# Patient Record
Sex: FEMALE | Race: OTHER | URBAN - METROPOLITAN AREA
[De-identification: names, ages, dates, MRNs, and addresses within clinical notes are randomized per-mention and may not be internally consistent; named-entity substitution may affect disease eponyms.]

---
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
## 2023-01-30 ENCOUNTER — OFFICE VISIT (OUTPATIENT)
Dept: FAMILY MEDICINE CLINIC | Age: 23
End: 2023-01-30

## 2023-01-30 VITALS
OXYGEN SATURATION: 99 % | DIASTOLIC BLOOD PRESSURE: 68 MMHG | TEMPERATURE: 98.4 F | HEART RATE: 58 BPM | HEIGHT: 66 IN | SYSTOLIC BLOOD PRESSURE: 121 MMHG | WEIGHT: 217 LBS | BODY MASS INDEX: 34.87 KG/M2

## 2023-01-30 DIAGNOSIS — Z00.00 WELLNESS EXAMINATION: Primary | ICD-10-CM

## 2023-01-30 PROCEDURE — 99203 OFFICE O/P NEW LOW 30 MIN: CPT | Performed by: FAMILY MEDICINE

## 2023-01-30 NOTE — PROGRESS NOTES
Carlota Siddiqi seen at d/c, full name and  verified. After Visit Summary was mailed  and reviewed along with discharge instructions and when it is recommended to come back via phone . Advised patient that follow up is pending results, patient verbalized understanding. Will HERSON Chin    Patient left office prior to being discharged in office. Patient was called and given all instructions via phone. Informed that influenza vaccine can be given at her next appointment, patient verbalized understanding.   Will HERSON Chin

## 2023-01-30 NOTE — PROGRESS NOTES
Felipe Casiano is a 23 y.o. female   Chief Complaint   Patient presents with   [facility] Establish Care     Patient requesting check up    Immunization/Injection     FLu vaccine         ASSESSMENT AND PLAN:    1. Wellness examination  Normal vital signs and physical exam.  Labs today. Follow up pending results. - CBC WITH AUTOMATED DIFF; Future  - HEMOGLOBIN A1C WITH EAG; Future  - LIPID PANEL; Future  - METABOLIC PANEL, COMPREHENSIVE; Future  - TSH 3RD GENERATION; Future    SUBJECTIVE:    HPI:  Dona Sallisaw. Julius Cordero is a 23 y.o. female who presents for a checkup. She notes that she has gained weight over the past few months and her family has always advised her to check for diabetes because her Grandmother had it. She notes that certain foods cause disgust - like hamburgers. She has been thirsty and craving ice in particular. She has been urinating with mildly increased frequency. Sometimes her hands tingle. PMH: None  PSH: None  MEDS: None  ALL: NKDA  SH: Rare cigarette smoking (socially). Social alcohol and marijuana use. FH: GM - DM2    LMP 10 Dec.  Irregular -- she has a subdermal contraceptive implant (5 yr)  G0. Review of Systems   Constitutional:  Negative for fever and malaise/fatigue. Eyes:  Negative for blurred vision. Respiratory:  Negative for cough and shortness of breath. Cardiovascular:  Negative for chest pain, palpitations and leg swelling. Gastrointestinal:  Negative for abdominal pain, constipation, diarrhea, nausea and vomiting. Genitourinary:  Positive for frequency. Neurological:  Positive for tingling. Negative for dizziness and headaches. Endo/Heme/Allergies:  Positive for polydipsia.        /68 (BP 1 Location: Left upper arm, BP Patient Position: Sitting)   Pulse (!) 58   Temp 98.4 °F (36.9 °C) (Temporal)   Ht 5' 6.14\" (1.68 m)   Wt 217 lb (98.4 kg)   LMP 12/10/2022   SpO2 99%   BMI 34.87 kg/m²     Physical Exam  Constitutional: General: She is not in acute distress. Appearance: Normal appearance. HENT:      Head: Normocephalic and atraumatic. Mouth/Throat:      Mouth: Mucous membranes are moist.      Pharynx: Oropharynx is clear. No oropharyngeal exudate or posterior oropharyngeal erythema. Eyes:      Extraocular Movements: Extraocular movements intact. Conjunctiva/sclera: Conjunctivae normal.      Pupils: Pupils are equal, round, and reactive to light. Cardiovascular:      Rate and Rhythm: Normal rate and regular rhythm. Pulses: Normal pulses. Heart sounds: Normal heart sounds. Pulmonary:      Effort: Pulmonary effort is normal.      Breath sounds: Normal breath sounds. Abdominal:      General: Bowel sounds are normal. There is no distension. Palpations: Abdomen is soft. Tenderness: There is no abdominal tenderness. Musculoskeletal:         General: Normal range of motion. Cervical back: No tenderness. Right lower leg: No edema. Left lower leg: No edema. Lymphadenopathy:      Cervical: No cervical adenopathy. Neurological:      Mental Status: She is alert and oriented to person, place, and time.

## 2025-03-06 ENCOUNTER — TELEPHONE (OUTPATIENT)
Age: 25
End: 2025-03-06

## 2025-03-06 NOTE — TELEPHONE ENCOUNTER
Right sided chest pain close to the breast not constant comes and goes. A heaviness. When I touch it feels a little swollen and painful. The pain came in the back on the right side in the back this week too. Pain in the right arm. 3/10. The pain is like a heaviness. Dull intermittent. The pain started 3 weeks ago. The pt denied any sob, any fever or chills nausea or vomiting. Denied that she was breast feeding, the pt was advised to go to the ED if she was having a life threatening emergency. The pt's last office visit was 01/30/2023. The last progress note that day did not stated for the pt to follow up with an appt. I gave the pt all of the same day appt protocol and informed her of the [employer] website she see the CAV complete same day appt. March Calendar. Donna Ji RN

## 2025-03-06 NOTE — TELEPHONE ENCOUNTER
Tc to the pt responding to an [employer] message she had sent to Dr Mistry. She had stated she wanted to schedule an appt with Dr Mistry to discuss chest pain she had been having for a few weeks. I called the pt 2x. No answer. I left a message for her to call me back. Donna iJ RN

## 2025-03-20 ENCOUNTER — OFFICE VISIT (OUTPATIENT)
Age: 25
End: 2025-03-20

## 2025-03-20 VITALS — SYSTOLIC BLOOD PRESSURE: 132 MMHG | DIASTOLIC BLOOD PRESSURE: 79 MMHG | TEMPERATURE: 98 F

## 2025-03-20 DIAGNOSIS — R07.89 CHEST WALL PAIN: Primary | ICD-10-CM

## 2025-03-20 NOTE — PATIENT INSTRUCTIONS
Recommend Motrin and/or Tylenol.  May try ice or heat therapy.  Keep follow up with GYN tomorrow.

## 2025-03-20 NOTE — PROGRESS NOTES
Patient Name: Alejandra Sofia Henriquez Reyes   YOB: 2000   Patient Status: New patient,   Chief Complaint: Other (Chest wall pain on right side, right pectoral muscle pain, pain with touch, NKI, no cough or chest congestion)      ____________________________________________________________________________________________    External Records Reviewed: None    Limitation to History: None    Outside Historian: None    SUBJECTIVE/OBJECTIVE:  Alejandra Sofia Henriquez Reyes is a 24 y.o. female presents with complaint of right upper chest tenderness / pain.  Symptoms began 4 week(s) ago reportedly after getting angry and having a strong emotion and show no change since onset.  The patient denies fever, erythema or warmth, shortness of breath, or palpitations.  Patient reports taking nothing for symptoms. She reports that she has an appointment with GYN tomorrow for breast examination.  No other acute symptoms reported at this time.          PAST MEDICAL HISTORY:   Medical: Pt  has no past medical history on file.  Surgical: Pt  has no past surgical history on file.  Family: Pt family history is not on file.  Social: Pt   Social History     Socioeconomic History    Marital status: Single     Spouse name: Not on file    Number of children: Not on file    Years of education: Not on file    Highest education level: Not on file   Occupational History    Not on file   Tobacco Use    Smoking status: Some Days    Smokeless tobacco: Never   Substance and Sexual Activity    Alcohol use: Yes    Drug use: Yes     Types: Marijuana (Weed)    Sexual activity: Not on file   Other Topics Concern    Not on file   Social History Narrative    Not on file     Social Drivers of Health     Financial Resource Strain: Not on file   Food Insecurity: Not on file (1/30/2023)   Transportation Needs: Not on file   Physical Activity: Not on file   Stress: Not on file   Social Connections: Not on file   Intimate Partner Violence: Not

## 2025-06-03 ENCOUNTER — OFFICE VISIT (OUTPATIENT)
Age: 25
End: 2025-06-03
Payer: COMMERCIAL

## 2025-06-03 VITALS
RESPIRATION RATE: 18 BRPM | DIASTOLIC BLOOD PRESSURE: 70 MMHG | SYSTOLIC BLOOD PRESSURE: 120 MMHG | BODY MASS INDEX: 39.21 KG/M2 | TEMPERATURE: 97 F | HEART RATE: 92 BPM | OXYGEN SATURATION: 99 % | WEIGHT: 244 LBS | HEIGHT: 66 IN

## 2025-06-03 DIAGNOSIS — H92.02 LEFT EAR PAIN: ICD-10-CM

## 2025-06-03 DIAGNOSIS — J01.90 ACUTE BACTERIAL SINUSITIS: Primary | ICD-10-CM

## 2025-06-03 DIAGNOSIS — B96.89 ACUTE BACTERIAL SINUSITIS: Primary | ICD-10-CM

## 2025-06-03 PROCEDURE — 99213 OFFICE O/P EST LOW 20 MIN: CPT

## 2025-06-03 SDOH — ECONOMIC STABILITY: FOOD INSECURITY: WITHIN THE PAST 12 MONTHS, THE FOOD YOU BOUGHT JUST DIDN'T LAST AND YOU DIDN'T HAVE MONEY TO GET MORE.: NEVER TRUE

## 2025-06-03 SDOH — ECONOMIC STABILITY: FOOD INSECURITY: WITHIN THE PAST 12 MONTHS, YOU WORRIED THAT YOUR FOOD WOULD RUN OUT BEFORE YOU GOT MONEY TO BUY MORE.: NEVER TRUE

## 2025-06-03 ASSESSMENT — PATIENT HEALTH QUESTIONNAIRE - PHQ9
SUM OF ALL RESPONSES TO PHQ QUESTIONS 1-9: 0
SUM OF ALL RESPONSES TO PHQ QUESTIONS 1-9: 0
2. FEELING DOWN, DEPRESSED OR HOPELESS: NOT AT ALL
1. LITTLE INTEREST OR PLEASURE IN DOING THINGS: NOT AT ALL
SUM OF ALL RESPONSES TO PHQ QUESTIONS 1-9: 0
SUM OF ALL RESPONSES TO PHQ QUESTIONS 1-9: 0

## 2025-06-03 NOTE — PROGRESS NOTES
6/3/2025    Alejandra Sofia Henriquez Reyes (:  2000) is a 24 y.o. female, here for a preventive medicine evaluation.    Subjective   There is no problem list on file for this patient.      Review of Systems    Prior to Visit Medications    Medication Sig Taking? Authorizing Provider   [id number] PO Take by mouth Yes Provider, MD Pattie        No Known Allergies    History reviewed. No pertinent past medical history.    History reviewed. No pertinent surgical history.    Social History     Socioeconomic History    Marital status: Single     Spouse name: Not on file    Number of children: Not on file    Years of education: Not on file    Highest education level: Not on file   Occupational History    Not on file   Tobacco Use    Smoking status: Former     Types: Cigarettes     Passive exposure: Past    Smokeless tobacco: Never   Vaping Use    Vaping status: Never Used   Substance and Sexual Activity    Alcohol use: Not Currently    Drug use: Not Currently     Types: Marijuana (Weed)    Sexual activity: Yes     Partners: Male   Other Topics Concern    Not on file   Social History Narrative    Not on file     Social Drivers of Health     Financial Resource Strain: Not on file   Food Insecurity: No Food Insecurity (6/3/2025)    Hunger Vital Sign     Worried About Running Out of Food in the Last Year: Never true     Ran Out of Food in the Last Year: Never true   Transportation Needs: No Transportation Needs (6/3/2025)    PRAPARE - Transportation     Lack of Transportation (Medical): No     Lack of Transportation (Non-Medical): No   Physical Activity: Not on file   Stress: Not on file   Social Connections: Not on file   Intimate Partner Violence: Not on file   Housing Stability: Low Risk  (6/3/2025)    Housing Stability Vital Sign     Unable to Pay for Housing in the Last Year: No     Number of Times Moved in the Last Year: 0     Homeless in the Last Year: No        Family History   Problem

## 2025-06-03 NOTE — PROGRESS NOTES
Chief Complaint   Patient presents with    Ear Pain     left         \"Have you been to the ER, urgent care clinic since your last visit?  Hospitalized since your last visit?\"    YES - When: approximately 3  weeks ago.  Where and Why: [facility]: Left Ear pain.    “Have you seen or consulted any other health care providers outside of [facility] since your last visit?”    NO     “Have you had a pap smear?”    NO    No cervical cancer screening on file             Click Here for Release of Records Request           6/3/2025     1:31 PM   PHQ-9    Little interest or pleasure in doing things 0   Feeling down, depressed, or hopeless 0   PHQ-2 Score 0   PHQ-9 Total Score 0           Financial Resource Strain: Not on file      Food Insecurity: No Food Insecurity (6/3/2025)    Hunger Vital Sign     Worried About Running Out of Food in the Last Year: Never true     Ran Out of Food in the Last Year: Never true          Health Maintenance Due   Topic Date Due    Depression Screen  Never done    Varicella vaccine (1 of 2 - 13+ 2-dose series) Never done    HPV vaccine (1 - 3-dose series) Never done    HIV screen  Never done    Meningococcal B vaccine (1 of 2 - Standard) Never done    Chlamydia/GC screen  Never done    Hepatitis C screen  Never done    Hepatitis B vaccine (1 of 3 - 19+ 3-dose series) Never done    DTaP/Tdap/Td vaccine (1 - Tdap) Never done    Pneumococcal 0-49 years Vaccine (1 of 2 - PCV) Never done    Pap smear  Never done    COVID-19 Vaccine (1 - 2024-25 season) Never done

## 2025-06-03 NOTE — PROGRESS NOTES
[facility]  [address] [place] [address]  Office [phone], Fax [phone]    Subjective:     Chief Complaint   Patient presents with    Ear Pain     left       HPI:    History of Present Illness  The patient is a 24-year-old female presenting with left-sided ear pain. She reports ear pain, nasal congestion for 2-3 days, clogged ears, and impaired hearing. She frequently uses Q-tips but denies deep insertion or injury. No foreign objects or febrile episodes. She experiences head pressure, occasional eye pain, and jaw discomfort. No current medications or recent household illness. Suspects recent swimming activity as a contributing factor. No history of recurrent ear infections, but has had tonsillitis. Currently 15 weeks pregnant and considering vitamin C supplements.    Left-sided Ear Pain  - Onset: Recent, associated with swimming activity.  - Location: Left side of the ear.  - Duration: 2-3 days.  - Character: Ear pain, clogged ears, impaired hearing.  - Alleviating/Aggravating Factors: Frequently uses Q-tips but denies deep insertion or injury.  - Severity: No foreign objects or febrile episodes.    Nasal Congestion  - Onset: 2-3 days.  - Duration: 2-3 days.  - Character: Nasal congestion.    Head Pressure, Occasional Eye Pain, and Jaw Discomfort  - Character: Head pressure, occasional eye pain, jaw discomfort.    Supplemental Information  - Currently 15 weeks pregnant and considering vitamin C supplements.  - No current medications or recent household illness.  - No history of recurrent ear infections, but has had tonsillitis.           Health Maintenance:  Health Maintenance Due   Topic Date Due    Depression Screen  Never done    Varicella vaccine (1 of 2 - 13+ 2-dose series) Never done    HPV vaccine (1 - 3-dose series) Never done    HIV screen  Never done    Meningococcal B vaccine (1 of 2 - Standard) Never done    Chlamydia/GC screen  Never done

## 2025-07-03 ENCOUNTER — OFFICE VISIT (OUTPATIENT)
Age: 25
End: 2025-07-03
Payer: COMMERCIAL

## 2025-07-03 VITALS
RESPIRATION RATE: 18 BRPM | TEMPERATURE: 97.3 F | WEIGHT: 246 LBS | HEART RATE: 80 BPM | HEIGHT: 66 IN | BODY MASS INDEX: 39.53 KG/M2 | OXYGEN SATURATION: 98 % | DIASTOLIC BLOOD PRESSURE: 77 MMHG | SYSTOLIC BLOOD PRESSURE: 118 MMHG

## 2025-07-03 DIAGNOSIS — R51.9 CHRONIC NONINTRACTABLE HEADACHE, UNSPECIFIED HEADACHE TYPE: Primary | ICD-10-CM

## 2025-07-03 DIAGNOSIS — Z3A.20 20 WEEKS GESTATION OF PREGNANCY: ICD-10-CM

## 2025-07-03 DIAGNOSIS — G89.29 CHRONIC NONINTRACTABLE HEADACHE, UNSPECIFIED HEADACHE TYPE: Primary | ICD-10-CM

## 2025-07-03 LAB
APPEARANCE UR: CLEAR
BILIRUB UR QL: NEGATIVE
COLOR UR: ABNORMAL
CREAT UR-MCNC: 70.8 MG/DL
GLUCOSE UR STRIP.AUTO-MCNC: NEGATIVE MG/DL
HGB UR QL STRIP: NEGATIVE
KETONES UR QL STRIP.AUTO: NEGATIVE MG/DL
LEUKOCYTE ESTERASE UR QL STRIP.AUTO: NEGATIVE
NITRITE UR QL STRIP.AUTO: NEGATIVE
PH UR STRIP: 8.5 (ref 5–8)
PROT UR STRIP-MCNC: NEGATIVE MG/DL
PROT UR-MCNC: 17 MG/DL (ref 0–11.9)
PROT/CREAT UR-RTO: 0.2
SP GR UR REFRACTOMETRY: 1.01 (ref 1–1.03)
UROBILINOGEN UR QL STRIP.AUTO: 0.2 EU/DL (ref 0.2–1)

## 2025-07-03 PROCEDURE — 99213 OFFICE O/P EST LOW 20 MIN: CPT

## 2025-07-03 RX ORDER — ASPIRIN 81 MG/1
81 TABLET, CHEWABLE ORAL DAILY
COMMUNITY

## 2025-07-03 NOTE — PROGRESS NOTES
Chief Complaint   Patient presents with    New Patient     Establishing care    Headache     Pt complains of headache on the left side of the face .(Pt has stitches on the lefyt side of the head )     \"Have you been to the ER, urgent care clinic since your last visit?  Hospitalized since your last visit?\"    YES - When: approximately 1 months ago.  [facility] and Why[facility].    “Have you seen or consulted any other health care providers outside of [facility] since your last visit?”    NO        “Have you had a pap smear?”    NO    No cervical cancer screening on file                 6/3/2025     1:31 PM   PHQ-9    Little interest or pleasure in doing things 0   Feeling down, depressed, or hopeless 0   PHQ-2 Score 0   PHQ-9 Total Score 0           Financial Resource Strain: Not on file      Food Insecurity: No Food Insecurity (6/3/2025)    Hunger Vital Sign     Worried About Running Out of Food in the Last Year: Never true     Ran Out of Food in the Last Year: Never true          Health Maintenance Due   Topic Date Due    Varicella vaccine (1 of 2 - 13+ 2-dose series) Never done    HPV vaccine (1 - 3-dose series) Never done    HIV screen  Never done    Meningococcal B vaccine (1 of 2 - Standard) Never done    Chlamydia/GC screen  Never done    Hepatitis C screen  Never done    Hepatitis B vaccine (1 of 3 - 19+ 3-dose series) Never done    DTaP/Tdap/Td vaccine (1 - Tdap) Never done    Pap smear  Never done    COVID-19 Vaccine (1 - 2024-25 season) Never done

## 2025-07-03 NOTE — PROGRESS NOTES
[facility]  [address] [place] [address]  Office [phone], Fax [phone]    Assessment/Plan:   Assessment:   24-year-old female presents as a new patient to establish care, with acute concerns of headache during pregnancy.  Assessment & Plan  1. Headache: Chronic.  - Possible tension-type headaches, not typical migraines. Normal BP reduces preeclampsia likelihood. Recent eyewear change may contribute.  - Urine test today to investigate preeclampsia.  - Continue Tylenol and hydration.  - Contact OB/GYN if condition worsens.    2. Pregnancy.  - 20 weeks pregnant, taking baby aspirin for preeclampsia prevention.  - Discuss headaches with OB/GYN at next appointment on 07/18/2025.  - OB/GYN conducting urine checks at every visit.    Follow-up  - Follow-up scheduled for February 2026 for well woman visit       1. Chronic nonintractable headache, unspecified headache type  -     Urinalysis; Future  -     Protein / creatinine ratio, urine; Future  2. 20 weeks gestation of pregnancy        Return in about 6 months (around 1/3/2026) for Follow Up and Well Womans Visit.       Subjective:     Chief Complaint   Patient presents with    New Patient     Establishing care    Headache     Pt complains of headache on the left side of the face .(Pt has stitches on the lefyt side of the head )       HPI:    History of Present Illness  The patient presents to establish care and for evaluation of headaches.    Headaches  She experienced a concussion in May 2024, requiring sutures, but did not seek follow-up care post-suture removal. Since January 2025, she has had intermittent left-sided headaches, distinct from previous ones, exacerbated by stress, and present before her current pregnancy. Duration varies from an hour to a day. This week, she has had constant headaches since Sunday. She reports eye weakness and pain with pressure, consulted an ophthalmologist for myopia, and has used

## 2025-07-07 ENCOUNTER — RESULTS FOLLOW-UP (OUTPATIENT)
Age: 25
End: 2025-07-07

## 2025-08-31 ENCOUNTER — OFFICE VISIT (OUTPATIENT)
Age: 25
End: 2025-08-31

## 2025-08-31 VITALS
HEART RATE: 91 BPM | WEIGHT: 255.8 LBS | RESPIRATION RATE: 18 BRPM | SYSTOLIC BLOOD PRESSURE: 112 MMHG | OXYGEN SATURATION: 97 % | BODY MASS INDEX: 41.11 KG/M2 | TEMPERATURE: 99.1 F | DIASTOLIC BLOOD PRESSURE: 76 MMHG

## 2025-08-31 DIAGNOSIS — U07.1 COVID-19: Primary | ICD-10-CM

## 2025-08-31 DIAGNOSIS — H66.001 NON-RECURRENT ACUTE SUPPURATIVE OTITIS MEDIA OF RIGHT EAR WITHOUT SPONTANEOUS RUPTURE OF TYMPANIC MEMBRANE: ICD-10-CM

## 2025-08-31 RX ORDER — AMOXICILLIN 875 MG/1
875 TABLET, COATED ORAL 2 TIMES DAILY
Qty: 20 TABLET | Refills: 0 | Status: SHIPPED | OUTPATIENT
Start: 2025-08-31 | End: 2025-09-10